# Patient Record
Sex: MALE | Race: WHITE | NOT HISPANIC OR LATINO | Employment: FULL TIME | URBAN - METROPOLITAN AREA
[De-identification: names, ages, dates, MRNs, and addresses within clinical notes are randomized per-mention and may not be internally consistent; named-entity substitution may affect disease eponyms.]

---

## 2017-05-30 ENCOUNTER — OFFICE VISIT - HEALTHEAST (OUTPATIENT)
Dept: INTERNAL MEDICINE | Facility: CLINIC | Age: 47
End: 2017-05-30

## 2017-05-30 DIAGNOSIS — R22.9 SKIN NODULE: ICD-10-CM

## 2017-05-30 DIAGNOSIS — Z12.11 COLON CANCER SCREENING: ICD-10-CM

## 2018-01-01 ENCOUNTER — TRANSFERRED RECORDS (OUTPATIENT)
Dept: MULTI SPECIALTY CLINIC | Facility: CLINIC | Age: 48
End: 2018-01-01

## 2018-05-11 ENCOUNTER — TELEPHONE (OUTPATIENT)
Dept: OTHER | Facility: CLINIC | Age: 48
End: 2018-05-11

## 2018-05-11 NOTE — TELEPHONE ENCOUNTER
5/11/2018    Call Regarding Onboarding [employer] [facility] other     Attempt 1    Message on voicemail    Comments: 1 spouse dep       Outreach   Renu Johnston

## 2018-05-30 NOTE — TELEPHONE ENCOUNTER
5/30/2018    Call Regarding Onboarding [employer] [facility] OTHER    Attempt 2    Message on voicemail     Comments: spouse      Outreach   ROHAN

## 2018-06-11 NOTE — TELEPHONE ENCOUNTER
6/11/2018    Call Regarding Onboarding [facility] [employer] OTHER     Attempt 3    Message on voicemail     Comments:       Outreach   SB

## 2021-05-31 VITALS — WEIGHT: 195.1 LBS | BODY MASS INDEX: 26.31 KG/M2

## 2021-06-10 NOTE — PROGRESS NOTES
ASSESSMENT:  1. Colon cancer screening  His brother had a polyp (adenoma) removed at age 50, Dr. Lino Ann recommended siblings start colon cancer screening at age 40.  As a result, I counseled him on colon cancer screening, referred him to [place] [facility].  - Ambulatory referral for Colonoscopy    2. Skin nodule  He had a small pea-sized nodule in the left axilla which resolved within a week.  He had a similar lesion in his neck which also resolved quickly.  I advised him that this could be a shotty lymph node.  Also on the differential would be minor skin or subcutaneous infection.  If it is otherwise asymptomatic, small, and resolves within a month, it is unlikely to require additional workup.  Contrasted to his skin infection or abscess, he should seek medical attention for the infection.    He gets cholesterol and blood sugar checks through work.  His blood pressure is normal.  He has had a healthy body weight, if not borderline overweight based on BMI standard.  I think he is in good health.        PLAN:  Patient Instructions   Colonoscopy referral--  See  downstairs     Pea-sized nodule in armpit-- let me know if recurs and persists >1 month or is growing       Orders Placed This Encounter   Procedures     Ambulatory referral for Colonoscopy     Referral Priority:   Routine     Referral Type:   Colonoscopy     Referral Reason:   Evaluation and Treatment     Requested Specialty:   Gastroenterology     Number of Visits Requested:   1     There are no discontinued medications.    Return if symptoms worsen or fail to improve, for Annual physical.    CHIEF COMPLAINT:  Chief Complaint   Patient presents with     lump under arm     lump under left armpit-has since gone away. talk about getting a colonoscopy       HISTORY OF PRESENT ILLNESS:  Zachariah is a 47 y.o. male presenting to the clinic today with concerns of a lump. About two weeks ago he noticed a pea sized lump under his left  armpit. The lump was not visible to the naked eye but was easily palpated, he reports. However, in the past few days the lump has mysteriously resolved and is no longer there.  He had a similar lump in his neck a few months ago and saw Dr. Leal but the lump then had resolved by the time he saw Dr. Leal.     Health maintenance:  He would like to have a colonoscopy as he received a letter from his brother's gastroenterologist recommending his siblings get screening colonoscopies after a tubular adenoma was found.     REVIEW OF SYSTEMS:   He denies any skin rashes or painful lesions.   All other systems are negative.    PFSH:  He is from [place]. He moved to the [facility] in 1998. His father is a retired internist from [place].    TOBACCO USE:  History   Smoking Status     Never Smoker   Smokeless Tobacco     Not on file       VITALS:  Vitals:    05/30/17 1517   BP: 128/82   Pulse: 66   Weight: 195 lb 1.6 oz (88.5 kg)     Wt Readings from Last 3 Encounters:   05/30/17 195 lb 1.6 oz (88.5 kg)   02/17/16 185 lb (83.9 kg)       PHYSICAL EXAM:  Lymphatics: No adenopathy in the axilla bilaterally.   Skin:  Clear. No rashes.     ADDITIONAL HISTORY SUMMARIZED (2): Notes from 2/2017 from Dr. Leal reviewed regarding skin irritation.  DECISION TO OBTAIN EXTRA INFORMATION (1): Care everywhere accessed.   RADIOLOGY TESTS (1): None.  LABS (1): None.  MEDICINE TESTS (1): None.  INDEPENDENT REVIEW (2 each): None.     QUALITY MEASURES:  The following high BMI interventions were performed this visit: encouragement to exercise     The visit lasted a total of 18 minutes face to face with the patient. Over 50% of the time was spent counseling and educating the patient about health maintenance.    ISteven, am scribing for and in the presence of, Dr. Almonte.    I, Dr. Almonte, personally performed the services described in this documentation, as scribed by Steven Granado in my presence, and it is both accurate and  complete.    MEDICATIONS:  No current outpatient prescriptions on file.     No current facility-administered medications for this visit.        Total data points: 3.     Steven Almonte DO  Internal Medicine  [facility]

## 2021-06-27 ENCOUNTER — HEALTH MAINTENANCE LETTER (OUTPATIENT)
Age: 51
End: 2021-06-27

## 2021-10-13 ENCOUNTER — OFFICE VISIT (OUTPATIENT)
Dept: FAMILY MEDICINE | Facility: CLINIC | Age: 51
End: 2021-10-13
Payer: COMMERCIAL

## 2021-10-13 VITALS
HEIGHT: 72 IN | HEART RATE: 82 BPM | TEMPERATURE: 97.2 F | BODY MASS INDEX: 23.3 KG/M2 | WEIGHT: 172 LBS | SYSTOLIC BLOOD PRESSURE: 93 MMHG | RESPIRATION RATE: 18 BRPM | DIASTOLIC BLOOD PRESSURE: 62 MMHG

## 2021-10-13 DIAGNOSIS — Z00.00 VISIT FOR PREVENTIVE HEALTH EXAMINATION: Primary | ICD-10-CM

## 2021-10-13 DIAGNOSIS — Z13.228 SCREENING FOR ENDOCRINE, NUTRITIONAL, METABOLIC AND IMMUNITY DISORDER: ICD-10-CM

## 2021-10-13 DIAGNOSIS — Z13.0 SCREENING FOR ENDOCRINE, NUTRITIONAL, METABOLIC AND IMMUNITY DISORDER: ICD-10-CM

## 2021-10-13 DIAGNOSIS — Z11.4 SCREENING FOR HUMAN IMMUNODEFICIENCY VIRUS WITHOUT PRESENCE OF RISK FACTORS: ICD-10-CM

## 2021-10-13 DIAGNOSIS — Z13.29 SCREENING FOR ENDOCRINE, NUTRITIONAL, METABOLIC AND IMMUNITY DISORDER: ICD-10-CM

## 2021-10-13 DIAGNOSIS — Z12.5 SCREENING FOR PROSTATE CANCER: ICD-10-CM

## 2021-10-13 DIAGNOSIS — Z11.59 ENCOUNTER FOR HCV SCREENING TEST FOR LOW RISK PATIENT: ICD-10-CM

## 2021-10-13 DIAGNOSIS — Z12.11 SCREEN FOR COLON CANCER: ICD-10-CM

## 2021-10-13 DIAGNOSIS — R06.02 SOB (SHORTNESS OF BREATH): ICD-10-CM

## 2021-10-13 DIAGNOSIS — Z82.49 FAMILY HISTORY OF PREMATURE CORONARY ARTERY DISEASE: ICD-10-CM

## 2021-10-13 DIAGNOSIS — Z13.21 SCREENING FOR ENDOCRINE, NUTRITIONAL, METABOLIC AND IMMUNITY DISORDER: ICD-10-CM

## 2021-10-13 LAB
ALBUMIN SERPL-MCNC: 4.3 G/DL (ref 3.5–5)
ALP SERPL-CCNC: 63 U/L (ref 45–120)
ALT SERPL W P-5'-P-CCNC: 15 U/L (ref 0–45)
ANION GAP SERPL CALCULATED.3IONS-SCNC: 8 MMOL/L (ref 5–18)
AST SERPL W P-5'-P-CCNC: 15 U/L (ref 0–40)
BILIRUB SERPL-MCNC: 0.4 MG/DL (ref 0–1)
BUN SERPL-MCNC: 19 MG/DL (ref 8–22)
CALCIUM SERPL-MCNC: 10 MG/DL (ref 8.5–10.5)
CHLORIDE BLD-SCNC: 103 MMOL/L (ref 98–107)
CHOLEST SERPL-MCNC: 173 MG/DL
CO2 SERPL-SCNC: 29 MMOL/L (ref 22–31)
CREAT SERPL-MCNC: 0.9 MG/DL (ref 0.7–1.3)
ERYTHROCYTE [DISTWIDTH] IN BLOOD BY AUTOMATED COUNT: 12.4 % (ref 10–15)
FASTING STATUS PATIENT QL REPORTED: YES
GFR SERPL CREATININE-BSD FRML MDRD: >90 ML/MIN/1.73M2
GLUCOSE BLD-MCNC: 100 MG/DL (ref 70–125)
HBA1C MFR BLD: 5.7 % (ref 0–5.6)
HCT VFR BLD AUTO: 45.1 % (ref 40–53)
HDLC SERPL-MCNC: 58 MG/DL
HGB BLD-MCNC: 14.6 G/DL (ref 13.3–17.7)
HIV 1+2 AB+HIV1 P24 AG SERPL QL IA: NEGATIVE
LDLC SERPL CALC-MCNC: 105 MG/DL
MCH RBC QN AUTO: 28.9 PG (ref 26.5–33)
MCHC RBC AUTO-ENTMCNC: 32.4 G/DL (ref 31.5–36.5)
MCV RBC AUTO: 89 FL (ref 78–100)
PLATELET # BLD AUTO: 236 10E3/UL (ref 150–450)
POTASSIUM BLD-SCNC: 4.7 MMOL/L (ref 3.5–5)
PROT SERPL-MCNC: 7.6 G/DL (ref 6–8)
PSA SERPL-MCNC: 0.56 UG/L (ref 0–3.5)
RBC # BLD AUTO: 5.06 10E6/UL (ref 4.4–5.9)
SODIUM SERPL-SCNC: 140 MMOL/L (ref 136–145)
TRIGL SERPL-MCNC: 49 MG/DL
WBC # BLD AUTO: 4.6 10E3/UL (ref 4–11)

## 2021-10-13 PROCEDURE — 90471 IMMUNIZATION ADMIN: CPT | Performed by: FAMILY MEDICINE

## 2021-10-13 PROCEDURE — 85027 COMPLETE CBC AUTOMATED: CPT | Performed by: FAMILY MEDICINE

## 2021-10-13 PROCEDURE — G0103 PSA SCREENING: HCPCS | Performed by: FAMILY MEDICINE

## 2021-10-13 PROCEDURE — 80053 COMPREHEN METABOLIC PANEL: CPT | Performed by: FAMILY MEDICINE

## 2021-10-13 PROCEDURE — 36415 COLL VENOUS BLD VENIPUNCTURE: CPT | Performed by: FAMILY MEDICINE

## 2021-10-13 PROCEDURE — 90714 TD VACC NO PRESV 7 YRS+ IM: CPT | Performed by: FAMILY MEDICINE

## 2021-10-13 PROCEDURE — 86803 HEPATITIS C AB TEST: CPT | Performed by: FAMILY MEDICINE

## 2021-10-13 PROCEDURE — 90472 IMMUNIZATION ADMIN EACH ADD: CPT | Performed by: FAMILY MEDICINE

## 2021-10-13 PROCEDURE — 99386 PREV VISIT NEW AGE 40-64: CPT | Mod: 25 | Performed by: FAMILY MEDICINE

## 2021-10-13 PROCEDURE — 83036 HEMOGLOBIN GLYCOSYLATED A1C: CPT | Performed by: FAMILY MEDICINE

## 2021-10-13 PROCEDURE — 90750 HZV VACC RECOMBINANT IM: CPT | Performed by: FAMILY MEDICINE

## 2021-10-13 PROCEDURE — 80061 LIPID PANEL: CPT | Performed by: FAMILY MEDICINE

## 2021-10-13 PROCEDURE — 87389 HIV-1 AG W/HIV-1&-2 AB AG IA: CPT | Performed by: FAMILY MEDICINE

## 2021-10-13 ASSESSMENT — PATIENT HEALTH QUESTIONNAIRE - PHQ9
SUM OF ALL RESPONSES TO PHQ QUESTIONS 1-9: 5
5. POOR APPETITE OR OVEREATING: MORE THAN HALF THE DAYS

## 2021-10-13 ASSESSMENT — MIFFLIN-ST. JEOR: SCORE: 1668.19

## 2021-10-13 ASSESSMENT — ANXIETY QUESTIONNAIRES
6. BECOMING EASILY ANNOYED OR IRRITABLE: NOT AT ALL
3. WORRYING TOO MUCH ABOUT DIFFERENT THINGS: SEVERAL DAYS
2. NOT BEING ABLE TO STOP OR CONTROL WORRYING: MORE THAN HALF THE DAYS
7. FEELING AFRAID AS IF SOMETHING AWFUL MIGHT HAPPEN: NOT AT ALL
GAD7 TOTAL SCORE: 7
1. FEELING NERVOUS, ANXIOUS, OR ON EDGE: MORE THAN HALF THE DAYS
IF YOU CHECKED OFF ANY PROBLEMS ON THIS QUESTIONNAIRE, HOW DIFFICULT HAVE THESE PROBLEMS MADE IT FOR YOU TO DO YOUR WORK, TAKE CARE OF THINGS AT HOME, OR GET ALONG WITH OTHER PEOPLE: NOT DIFFICULT AT ALL
5. BEING SO RESTLESS THAT IT IS HARD TO SIT STILL: NOT AT ALL

## 2021-10-13 NOTE — PROGRESS NOTES
SUBJECTIVE:   CC: Zachariah Taylor is an 51 year old male who presents for preventative health visit.       Patient has been advised of split billing requirements and indicates understanding: Yes  Healthy Habits:     Getting at least 3 servings of Calcium per day:  NO    Bi-annual eye exam:  Yes    Dental care twice a year:  NO    Sleep apnea or symptoms of sleep apnea:  None    Diet:  Vegetarian/vegan    Frequency of exercise:  4-5 days/week    Duration of exercise:  30-45 minutes    Taking medications regularly:  Not Applicable    Medication side effects:  Not applicable    PHQ-2 Total Score: 0    Additional concerns today:  No    Shortness of breath with low blood pressure,Especially in the morning.  Mom has heart attack at age 52, brother just recently had a stent placed in his heart, dad has history of coronary artery disease.  Previous colonoscopy with polyps removal, due for follow-up.  Lots of stress at work lately.      Today's PHQ-2 Score:   PHQ-2 ( 1999 [employer]) 10/13/2021   Q1: Little interest or pleasure in doing things 0   Q2: Feeling down, depressed or hopeless 0   PHQ-2 Score 0   Q1: Little interest or pleasure in doing things Not at all   Q2: Feeling down, depressed or hopeless Not at all   PHQ-2 Score 0       Abuse: Current or Past(Physical, Sexual or Emotional)- No  Do you feel safe in your environment? Yes    Have you ever done Advance Care Planning? (For example, a Health Directive, POLST, or a discussion with a medical provider or your loved ones about your wishes): Yes, patient states has an Advance Care Planning document and will bring a copy to the clinic.    Social History     Tobacco Use     Smoking status: Never Smoker     Smokeless tobacco: Never Used   Substance Use Topics     Alcohol use: Not on file     If you drink alcohol do you typically have >3 drinks per day or >7 drinks per week? No    Alcohol Use 10/13/2021   Prescreen: >3 drinks/day or >7 drinks/week? Not Applicable        Last PSA: No results found for: PSA    Reviewed orders with patient. Reviewed health maintenance and updated orders accordingly - Yes  Lab work is in process  Labs reviewed in EPIC  BP Readings from Last 3 Encounters:   10/13/21 93/62    Wt Readings from Last 3 Encounters:   10/13/21 78 kg (172 lb)   05/30/17 88.5 kg (195 lb 1.6 oz)   02/17/16 83.9 kg (185 lb)                  There is no problem list on file for this patient.    No past surgical history on file.    Social History     Tobacco Use     Smoking status: Never Smoker     Smokeless tobacco: Never Used   Substance Use Topics     Alcohol use: Not on file     Family History   Problem Relation Age of Onset     Depression Mother      Heart Disease Mother      Dementia Maternal Uncle      Heart Disease Maternal Uncle      Dementia Maternal Grandmother      Alcoholism Maternal Grandfather          No current outpatient medications on file.     No Known Allergies    Reviewed and updated as needed this visit by clinical staff  Tobacco  Allergies  Meds              Reviewed and updated as needed this visit by Provider                No past medical history on file.   No past surgical history on file.  OB History   No obstetric history on file.       Review of Systems  CONSTITUTIONAL: NEGATIVE for fever, chills, change in weight  INTEGUMENTARY/SKIN: NEGATIVE for worrisome rashes, moles or lesions  EYES: NEGATIVE for vision changes or irritation  ENT: NEGATIVE for ear, mouth and throat problems  RESP: NEGATIVE for significant cough or SOB  CV: NEGATIVE for chest pain, palpitations or peripheral edema  GI: NEGATIVE for nausea, abdominal pain, heartburn, or change in bowel habits   male: negative for dysuria, hematuria, decreased urinary stream, erectile dysfunction, urethral discharge  MUSCULOSKELETAL: NEGATIVE for significant arthralgias or myalgia  NEURO: NEGATIVE for weakness, dizziness or paresthesias  ENDOCRINE: NEGATIVE for temperature intolerance,  skin/hair changes  PSYCHIATRIC: NEGATIVE for changes in mood or affect    OBJECTIVE:   BP 93/62 (BP Location: Left arm, Patient Position: Sitting, Cuff Size: Adult Regular)   Pulse 82   Temp 97.2  F (36.2  C) (Temporal)   Resp 18   Ht 1.829 m (6')   Wt 78 kg (172 lb)   BMI 23.33 kg/m      Physical Exam  GENERAL: healthy, alert and no distress  EYES: Eyes grossly normal to inspection, PERRL and conjunctivae and sclerae normal  HENT: ear canals and TM's normal, nose and mouth without ulcers or lesions  NECK: no adenopathy, no asymmetry, masses, or scars and thyroid normal to palpation  RESP: lungs clear to auscultation - no rales, rhonchi or wheezes  CV: regular rate and rhythm, normal S1 S2, no S3 or S4, no murmur, click or rub, no peripheral edema and peripheral pulses strong  ABDOMEN: soft, nontender, no hepatosplenomegaly, no masses and bowel sounds normal  MS: no gross musculoskeletal defects noted, no edema  SKIN: no suspicious lesions or rashes  NEURO: Normal strength and tone, mentation intact and speech normal  PSYCH: mentation appears normal, affect normal/bright    Diagnostic Test Results:  Labs reviewed in Epic    ASSESSMENT/PLAN:   (Z00.00) Visit for preventive health examination  (primary encounter diagnosis)  Comment:   Plan: Continue regular physical activities, healthy lifestyle living.    (Z13.29,  Z13.21,  Z13.228,  Z13.0) Screening for endocrine, nutritional, metabolic and immunity disorder  Comment:   Plan: **Comprehensive metabolic panel FUTURE 14d,         **A1C FUTURE 3mo, Lipid panel reflex to direct         LDL Fasting, **CBC with platelets FUTURE 14d            (Z11.59) Encounter for HCV screening test for low risk patient  Comment:   Plan: Hepatitis C antibody            (Z12.5) Screening for prostate cancer  Comment:   Plan: PSA, screen            (Z11.4) Screening for human immunodeficiency virus without presence of risk factors  Comment:   Plan: HIV Antigen Antibody Combo             (Z82.49) Family history of premature coronary artery disease  Comment: Family history of premature coronary artery disease, mom has heart attack at age 52, Older's brother just recently had a stent placed  Plan: CT Coronary Artery Angio w Calcium Score            (R06.02) SOB (shortness of breath)  Comment:   Plan: CT Coronary Artery Angio w Calcium Score            (Z12.11) Screen for colon cancer  Comment: Had a negative colonoscopy in 2018, was told that he needs to follow-up in 3 years.  Family states positive for polyps.  Plan: Adult Gastro Ref - Procedure Only              Patient has been advised of split billing requirements and indicates understanding: Yes  COUNSELING:   Reviewed preventive health counseling, as reflected in patient instructions       Regular exercise       Healthy diet/nutrition       Vision screening       Hearing screening       Consider Hep C screening for all patients one time for ages 18-79 years       HIV screeninx in teen years, 1x in adult years, and at intervals if high risk       Colon cancer screening       Prostate cancer screening       Advance Care Planning    Estimated body mass index is 23.33 kg/m  as calculated from the following:    Height as of this encounter: 1.829 m (6').    Weight as of this encounter: 78 kg (172 lb).         He reports that he has never smoked. He has never used smokeless tobacco.      Counseling Resources:  ATP IV Guidelines  Pooled Cohorts Equation Calculator  FRAX Risk Assessment  ICSI Preventive Guidelines  Dietary Guidelines for Americans,   USDA's MyPlate  ASA Prophylaxis  Lung CA Screening    Ean Leal MD  [facility]

## 2021-10-14 LAB — HCV AB SERPL QL IA: NEGATIVE

## 2021-10-14 ASSESSMENT — ANXIETY QUESTIONNAIRES: GAD7 TOTAL SCORE: 7

## 2021-10-17 ENCOUNTER — HEALTH MAINTENANCE LETTER (OUTPATIENT)
Age: 51
End: 2021-10-17

## 2021-10-28 ENCOUNTER — HOSPITAL ENCOUNTER (OUTPATIENT)
Dept: CT IMAGING | Facility: CLINIC | Age: 51
Discharge: HOME | End: 2021-10-28
Attending: FAMILY MEDICINE | Admitting: FAMILY MEDICINE
Payer: COMMERCIAL

## 2021-10-28 VITALS
DIASTOLIC BLOOD PRESSURE: 73 MMHG | SYSTOLIC BLOOD PRESSURE: 96 MMHG | BODY MASS INDEX: 23.7 KG/M2 | WEIGHT: 175 LBS | HEIGHT: 72 IN

## 2021-10-28 DIAGNOSIS — R06.02 SOB (SHORTNESS OF BREATH): ICD-10-CM

## 2021-10-28 DIAGNOSIS — Z82.49 FAMILY HISTORY OF PREMATURE CORONARY ARTERY DISEASE: ICD-10-CM

## 2021-10-28 DIAGNOSIS — Z13.6 ENCOUNTER FOR SCREENING FOR CORONARY ARTERY DISEASE: Primary | ICD-10-CM

## 2021-10-28 LAB
BSA FOR ECHO PROCEDURE: 2.01 M2
CV CALCIUM SCORE AGATSTON LM: 14
CV CALCIUM SCORING AGATSON LAD: 7
CV CALCIUM SCORING AGATSTON CX: 0
CV CALCIUM SCORING AGATSTON RCA: 0
CV CALCIUM SCORING AGATSTON TOTAL: 21

## 2021-10-28 PROCEDURE — 250N000013 HC RX MED GY IP 250 OP 250 PS 637: Performed by: FAMILY MEDICINE

## 2021-10-28 PROCEDURE — 250N000011 HC RX IP 250 OP 636: Performed by: FAMILY MEDICINE

## 2021-10-28 PROCEDURE — 75574 CT ANGIO HRT W/3D IMAGE: CPT

## 2021-10-28 PROCEDURE — 75574 CT ANGIO HRT W/3D IMAGE: CPT | Mod: 26 | Performed by: INTERNAL MEDICINE

## 2021-10-28 PROCEDURE — 250N000009 HC RX 250: Performed by: FAMILY MEDICINE

## 2021-10-28 RX ORDER — LIDOCAINE 40 MG/G
CREAM TOPICAL
Status: DISCONTINUED | OUTPATIENT
Start: 2021-10-28 | End: 2021-10-29 | Stop reason: HOSPADM

## 2021-10-28 RX ORDER — METOPROLOL TARTRATE 1 MG/ML
5-20 INJECTION, SOLUTION INTRAVENOUS
Status: DISCONTINUED | OUTPATIENT
Start: 2021-10-28 | End: 2021-10-29 | Stop reason: HOSPADM

## 2021-10-28 RX ORDER — DILTIAZEM HYDROCHLORIDE 5 MG/ML
10 INJECTION INTRAVENOUS
Status: DISCONTINUED | OUTPATIENT
Start: 2021-10-28 | End: 2021-10-29 | Stop reason: HOSPADM

## 2021-10-28 RX ORDER — IOPAMIDOL 755 MG/ML
100 INJECTION, SOLUTION INTRAVASCULAR ONCE
Status: COMPLETED | OUTPATIENT
Start: 2021-10-28 | End: 2021-10-28

## 2021-10-28 RX ORDER — NITROGLYCERIN 0.4 MG/1
0.4 TABLET SUBLINGUAL ONCE
Status: COMPLETED | OUTPATIENT
Start: 2021-10-28 | End: 2021-10-28

## 2021-10-28 RX ORDER — DILTIAZEM HYDROCHLORIDE 5 MG/ML
5-25 INJECTION INTRAVENOUS
Status: DISCONTINUED | OUTPATIENT
Start: 2021-10-28 | End: 2021-10-29 | Stop reason: HOSPADM

## 2021-10-28 RX ADMIN — METOPROLOL TARTRATE 5 MG: 1 INJECTION, SOLUTION INTRAVENOUS at 13:36

## 2021-10-28 RX ADMIN — NITROGLYCERIN 0.4 MG: 0.4 TABLET SUBLINGUAL at 13:32

## 2021-10-28 RX ADMIN — IOPAMIDOL 100 ML: 755 INJECTION, SOLUTION INTRAVENOUS at 13:36

## 2021-10-28 ASSESSMENT — MIFFLIN-ST. JEOR: SCORE: 1681.79

## 2021-11-03 ENCOUNTER — OFFICE VISIT (OUTPATIENT)
Dept: CARDIOLOGY | Facility: CLINIC | Age: 51
End: 2021-11-03
Payer: COMMERCIAL

## 2021-11-03 VITALS
DIASTOLIC BLOOD PRESSURE: 64 MMHG | SYSTOLIC BLOOD PRESSURE: 104 MMHG | WEIGHT: 176.3 LBS | BODY MASS INDEX: 23.91 KG/M2 | RESPIRATION RATE: 14 BRPM | HEART RATE: 60 BPM

## 2021-11-03 DIAGNOSIS — I25.10 CORONARY ARTERY DISEASE DUE TO LIPID RICH PLAQUE: Primary | ICD-10-CM

## 2021-11-03 DIAGNOSIS — I25.83 CORONARY ARTERY DISEASE DUE TO LIPID RICH PLAQUE: Primary | ICD-10-CM

## 2021-11-03 DIAGNOSIS — Z82.49 FAMILY HISTORY OF PREMATURE CORONARY ARTERY DISEASE: ICD-10-CM

## 2021-11-03 PROCEDURE — 99244 OFF/OP CNSLTJ NEW/EST MOD 40: CPT | Performed by: INTERNAL MEDICINE

## 2021-11-03 RX ORDER — ROSUVASTATIN CALCIUM 5 MG/1
5 TABLET, COATED ORAL DAILY
Qty: 90 TABLET | Refills: 4 | Status: SHIPPED | OUTPATIENT
Start: 2021-11-03 | End: 2022-11-14

## 2021-11-03 NOTE — PATIENT INSTRUCTIONS
We discussed scheduling a stress echocardiogram.We discussed and started a medication called Rosuvastatin at 5mg.Please monitor for muscle discomfort and we will plan blood work to follow up in 2 months.I will be in touch with the results suki the tests by my chart.My nurse is Beverley and her number is [phone]

## 2021-11-03 NOTE — LETTER
11/3/2021    Ean Lael MD  [address] [place] [address]    RE: Zachariah RICH Taylor       Dear Colleague,    I had the pleasure of seeing Zachariah Taylor in the [facility].    HEART CARE ENCOUNTER CONSULTATON NOTE      [facility]  [phone]      Assessment/Recommendations   Assessment/Plan:  1.  Patient endorses nonexertional shortness of breath does not sound clearly cardiac in etiology.  This has been a symptom has been present for some time and he does admit to a fair amount of work related stress.  Recent coronary CT angiography reported minimal coronary plaque with a calcium score of 21 placing him in the 50th percentile when compared to age and gender matched control group.  He has multiple family members with heart disease mostly would appear to be premature coronary artery disease the following issues were discussed.  1.  Dyspnea.  Again suspect noncardiac etiology but given persistent symptoms I have recommended stress echocardiography which will allow assessment of LV function pre and post exercise and to exclude structural causes of dyspnea.  We will plan to do this in the near future and has been requested.    2.  Risk modification.  As noted he has a mild elevation in coronary calcium scoring.  He has a significant family history of heart disease.  I discussed the potential initiation of low-dose rosuvastatin.  Given that his LDL is quite good at 105 I suggested starting on 5 mg and reevaluating.  We talked about the potential risks including musculoskeletal discomfort and liver abnormality.  He is interested in initiating rosuvastatin and having the blood work in approximately 2 months.  He will keep me updated if he has any specific symptoms or side effects related to the medication.  I did ask him to consider increasing his exercise regularity.    1.  Exercise stress echocardiogram  2.  Initiate Crestor 5 mg  daily with repeat cholesterol and liver tests in 2 months.  3.  Continue prudent diet and increase exercise.  4.  Further recommendations pending the outcome of the above tests.  If dyspnea persists despite evaluation of cardiovascular causes would refer back to his primary care physician to assess noncardiac causes.  Recent CT scan did not report any significant findings in the chest or mediastinum or upper abdomen.  Question whether there is some anxiety component to the dyspnea.  5.  We will follow up with the test results and make additional recommendations with tentative follow-up in 1 year pending the test results.       History of Present Illness/Subjective    HPI: Zachariah Taylor is a 51 year old male who is seen in consultation at the request of his primary care provider Dr. Leal.  He has a significant family history of heart disease which includes a father who had coronary artery disease, myocardial infarction and  of a stroke at age 74.  Mother with coronary artery disease in her later years.  3 uncles with coronary artery disease and heart attacks in their 60s.  First cousin who has coronary artery disease and  of a heart attack at age 39.  A brother who underwent coronary stenting to a 70% stenosis in the left anterior descending.  He has other family members with premature heart disease.    He reports no specific complaints of chest discomfort.  However he does endorse some shortness of breath.  He notes that when he first awakens for the day he feels short of breath and has to take a few breaths and symptoms improve over a 30-minute period of time.  He was sometimes feels short of breath at other times but not clearly exertional.  His primary exercise is walking the dog 5-6 times per week and does not notice any difficulty doing this activity or climbing stairs.  No reported symptoms of orthopnea or PND and no reported snoring history.    Cardiac risk factors are negative for tobacco,  negative for alcohol, hemoglobin A1c is borderline elevated at 5.7.  He is a vegetarian.  Lipids are reviewed and are most recently revealing of a total cholesterol 173, HDL 58, LDL of 105.  His fasting glucose was 100.  As outlined above he has a significant family history with multiple family members with premature coronary artery disease.  His primary care physician recently had him complete a CT coronary angiogram with the results described below.  No prior ECG.          Recent Coronary Angiogram Results:    Reading physician: Jose Mark MD Ordering physician: Ean Leal MD Study date: 10/28/2021       Patient Information    Patient Name   Zachariah Taylor MRN   [id number] Legal Sex   Male              Age   1970 (51 year old)     Reason for Exam  Priority: Routine  family h/o premature CAD; CAD screening, 10yr CHD risk < 10%; family h/o premature CAD   Dx: Family history of premature coronary artery disease [Z82.49 (ICD-10-CM)]; SOB (shortness of breath) [R06.02 (ICD-10-CM)]   Comments: Administration of IV contrast (contrast agent, dose, and amount) will be tailored to this examination per the appropriate written protocol listed in the Protocol E-Book, or by the supervising imaging provider.      Indications    Family history of premature coronary artery disease [Z82.49 (ICD-10-CM)]   SOB (shortness of breath) [R06.02 (ICD-10-CM)]     Interpretation Summary      The total Agatston score is 21. A calcium score in this range places the individual in the 50th percentile when compared to an age and gender matched control group and implies a moderate risk of cardiac events in the next ten years.    Normal coronary anatomy with minimal coronary atherosclerosis and no obstructive plaque identified.        Technical Details    TECHNIQUE:   gated CT scanning of the heart with and without intravenous contrast was performed on a Siemens Definition Flash CT scanner using Isovue 370 Omnipaque 350.   Sequential scanning was performed for coronary calcium evaluation. Spiral protocol for coronary CT angiography was performed .  61.  Pulse range 50% - 70% of the cardiac phase. The imaging protocol was individualized to minimize radiation exposure.  Image post processing was performed on a [employer] workstation.  This study was performed after discussion of the goals, risk benefits and alternatives of the procedure.  Risks discussed included the risk of contrast injection and diagnostic x-ray exposure.  The best reconstructions were obtained at 74% of the diastolic phase and 70% of the systolic phase.     Noncoronary Findings    Left Ventricle There is no thrombus present. There is no mass present.   Left Atrium No left atrial mass or thrombus.Normal pulmonary vein connection.   Aorta Normal caliber of the visualized proximal ascending aorta. Normal sized aortic root.   Aortic Valve Tricuspid aortic valve.   Pulmonary Artery Normal pulmonary artery and venous anatomy.  All pulmonary veins draining into the left atrium.   Pericardium Normal pericardial thickness.          Physical Examination  Review of Systems   Vitals: There were no vitals taken for this visit.  BMI= There is no height or weight on file to calculate BMI.  Wt Readings from Last 3 Encounters:   10/28/21 79.4 kg (175 lb)   10/13/21 78 kg (172 lb)   05/30/17 88.5 kg (195 lb 1.6 oz)       General Appearance:   no distress, normal body habitus   ENT/Mouth: membranes moist, no oral lesions or bleeding gums.      EYES:  no scleral icterus, normal conjunctivae   Neck: no carotid bruits or thyromegaly   Chest/Lungs:   lungs are clear to auscultation, no rales or wheezing,, equal chest wall expansion    Cardiovascular:   Regular. Normal first and second heart sounds with no murmurs, rubs, or gallops; the carotid, radial and posterior tibial pulses are intact, Jugular venous pressure within normal limits, no edema bilaterally    Abdomen:  no organomegaly,  masses, bruits, or tenderness; bowel sounds are present   Extremities: no cyanosis or clubbing   Skin: no xanthelasma, warm.    Neurologic: normal  bilateral, no tremors     Psychiatric: alert and oriented x3, calm        Please refer above for cardiac ROS details.        Medical History  Surgical History Family History Social History   No past medical history on file.  No past surgical history on file.  Family History   Problem Relation Age of Onset     Depression Mother      Heart Disease Mother      Dementia Maternal Uncle      Heart Disease Maternal Uncle      Dementia Maternal Grandmother      Alcoholism Maternal Grandfather         Social History     Socioeconomic History     Marital status:      Spouse name: Not on file     Number of children: Not on file     Years of education: Not on file     Highest education level: Not on file   Occupational History     Not on file   Tobacco Use     Smoking status: Never Smoker     Smokeless tobacco: Never Used   Substance and Sexual Activity     Alcohol use: Not on file     Drug use: Not on file     Sexual activity: Not on file   Other Topics Concern     Not on file   Social History Narrative     Not on file     Social Determinants of Health     Financial Resource Strain:      Difficulty of Paying Living Expenses:    Food Insecurity:      Worried About Running Out of Food in the Last Year:      Ran Out of Food in the Last Year:    Transportation Needs:      Lack of Transportation (Medical):      Lack of Transportation (Non-Medical):    Physical Activity:      Days of Exercise per Week:      Minutes of Exercise per Session:    Stress:      Feeling of Stress :    Social Connections:      Frequency of Communication with Friends and Family:      Frequency of Social Gatherings with Friends and Family:      Attends Shinto Services:      Active Member of Clubs or Organizations:      Attends Club or Organization Meetings:      Marital Status:    Intimate Partner  Violence:      Fear of Current or Ex-Partner:      Emotionally Abused:      Physically Abused:      Sexually Abused:            Medications  Allergies   No current outpatient medications on file.     No Known Allergies       Lab Results    Chemistry/lipid CBC Cardiac Enzymes/BNP/TSH/INR   Recent Labs   Lab Test 10/13/21  1025   CHOL 173   HDL 58      TRIG 49     Recent Labs   Lab Test 10/13/21  1025        Recent Labs   Lab Test 10/13/21  1025      POTASSIUM 4.7   CHLORIDE 103   CO2 29      BUN 19   CR 0.90   GFRESTIMATED >90   ROBY 10.0     Recent Labs   Lab Test 10/13/21  1025   CR 0.90     Recent Labs   Lab Test 10/13/21  1025   A1C 5.7*          Recent Labs   Lab Test 10/13/21  1025   WBC 4.6   HGB 14.6   HCT 45.1   MCV 89        Recent Labs   Lab Test 10/13/21  1025   HGB 14.6    No results for input(s): TROPONINI in the last 72432 hours.  No results for input(s): BNP, NTBNPI, NTBNP in the last 35392 hours.  No results for input(s): TSH in the last 45551 hours.  No results for input(s): INR in the last 12379 hours.     Chau Liu MD  [facility]    cc:   Ean Leal MD  [address] [place] [address]

## 2021-11-03 NOTE — PROGRESS NOTES
HEART CARE ENCOUNTER CONSULTATON NOTE      [facility]  [phone]      Assessment/Recommendations   Assessment/Plan:  1.  Patient endorses nonexertional shortness of breath does not sound clearly cardiac in etiology.  This has been a symptom has been present for some time and he does admit to a fair amount of work related stress.  Recent coronary CT angiography reported minimal coronary plaque with a calcium score of 21 placing him in the 50th percentile when compared to age and gender matched control group.  He has multiple family members with heart disease mostly would appear to be premature coronary artery disease the following issues were discussed.  1.  Dyspnea.  Again suspect noncardiac etiology but given persistent symptoms I have recommended stress echocardiography which will allow assessment of LV function pre and post exercise and to exclude structural causes of dyspnea.  We will plan to do this in the near future and has been requested.    2.  Risk modification.  As noted he has a mild elevation in coronary calcium scoring.  He has a significant family history of heart disease.  I discussed the potential initiation of low-dose rosuvastatin.  Given that his LDL is quite good at 105 I suggested starting on 5 mg and reevaluating.  We talked about the potential risks including musculoskeletal discomfort and liver abnormality.  He is interested in initiating rosuvastatin and having the blood work in approximately 2 months.  He will keep me updated if he has any specific symptoms or side effects related to the medication.  I did ask him to consider increasing his exercise regularity.    1.  Exercise stress echocardiogram  2.  Initiate Crestor 5 mg daily with repeat cholesterol and liver tests in 2 months.  3.  Continue prudent diet and increase exercise.  4.  Further recommendations pending the outcome of the above tests.  If dyspnea persists despite evaluation of cardiovascular causes  would refer back to his primary care physician to assess noncardiac causes.  Recent CT scan did not report any significant findings in the chest or mediastinum or upper abdomen.  Question whether there is some anxiety component to the dyspnea.  5.  We will follow up with the test results and make additional recommendations with tentative follow-up in 1 year pending the test results.       History of Present Illness/Subjective    HPI: Zachariah Taylor is a 51 year old male who is seen in consultation at the request of his primary care provider Dr. Leal.  He has a significant family history of heart disease which includes a father who had coronary artery disease, myocardial infarction and  of a stroke at age 74.  Mother with coronary artery disease in her later years.  3 uncles with coronary artery disease and heart attacks in their 60s.  First cousin who has coronary artery disease and  of a heart attack at age 39.  A brother who underwent coronary stenting to a 70% stenosis in the left anterior descending.  He has other family members with premature heart disease.    He reports no specific complaints of chest discomfort.  However he does endorse some shortness of breath.  He notes that when he first awakens for the day he feels short of breath and has to take a few breaths and symptoms improve over a 30-minute period of time.  He was sometimes feels short of breath at other times but not clearly exertional.  His primary exercise is walking the dog 5-6 times per week and does not notice any difficulty doing this activity or climbing stairs.  No reported symptoms of orthopnea or PND and no reported snoring history.    Cardiac risk factors are negative for tobacco, negative for alcohol, hemoglobin A1c is borderline elevated at 5.7.  He is a vegetarian.  Lipids are reviewed and are most recently revealing of a total cholesterol 173, HDL 58, LDL of 105.  His fasting glucose was 100.  As outlined above he has a  significant family history with multiple family members with premature coronary artery disease.  His primary care physician recently had him complete a CT coronary angiogram with the results described below.  No prior ECG.          Recent Coronary Angiogram Results:    Reading physician: Jose Mark MD Ordering physician: Ean Leal MD Study date: 10/28/2021       Patient Information    Patient Name   Zachariah Taylor MRN   [id number] Legal Sex   Male              Age   1970 (51 year old)     Reason for Exam  Priority: Routine  family h/o premature CAD; CAD screening, 10yr CHD risk < 10%; family h/o premature CAD   Dx: Family history of premature coronary artery disease [Z82.49 (ICD-10-CM)]; SOB (shortness of breath) [R06.02 (ICD-10-CM)]   Comments: Administration of IV contrast (contrast agent, dose, and amount) will be tailored to this examination per the appropriate written protocol listed in the Protocol E-Book, or by the supervising imaging provider.      Indications    Family history of premature coronary artery disease [Z82.49 (ICD-10-CM)]   SOB (shortness of breath) [R06.02 (ICD-10-CM)]     Interpretation Summary      The total Agatston score is 21. A calcium score in this range places the individual in the 50th percentile when compared to an age and gender matched control group and implies a moderate risk of cardiac events in the next ten years.    Normal coronary anatomy with minimal coronary atherosclerosis and no obstructive plaque identified.        Technical Details    TECHNIQUE:   gated CT scanning of the heart with and without intravenous contrast was performed on a Siemens Definition Flash CT scanner using Isovue 370 Omnipaque 350.  Sequential scanning was performed for coronary calcium evaluation. Spiral protocol for coronary CT angiography was performed .  61.  Pulse range 50% - 70% of the cardiac phase. The imaging protocol was individualized to minimize radiation  exposure.  Image post processing was performed on a [employer] workstation.  This study was performed after discussion of the goals, risk benefits and alternatives of the procedure.  Risks discussed included the risk of contrast injection and diagnostic x-ray exposure.  The best reconstructions were obtained at 74% of the diastolic phase and 70% of the systolic phase.     Noncoronary Findings    Left Ventricle There is no thrombus present. There is no mass present.   Left Atrium No left atrial mass or thrombus.Normal pulmonary vein connection.   Aorta Normal caliber of the visualized proximal ascending aorta. Normal sized aortic root.   Aortic Valve Tricuspid aortic valve.   Pulmonary Artery Normal pulmonary artery and venous anatomy.  All pulmonary veins draining into the left atrium.   Pericardium Normal pericardial thickness.          Physical Examination  Review of Systems   Vitals: There were no vitals taken for this visit.  BMI= There is no height or weight on file to calculate BMI.  Wt Readings from Last 3 Encounters:   10/28/21 79.4 kg (175 lb)   10/13/21 78 kg (172 lb)   05/30/17 88.5 kg (195 lb 1.6 oz)       General Appearance:   no distress, normal body habitus   ENT/Mouth: membranes moist, no oral lesions or bleeding gums.      EYES:  no scleral icterus, normal conjunctivae   Neck: no carotid bruits or thyromegaly   Chest/Lungs:   lungs are clear to auscultation, no rales or wheezing,, equal chest wall expansion    Cardiovascular:   Regular. Normal first and second heart sounds with no murmurs, rubs, or gallops; the carotid, radial and posterior tibial pulses are intact, Jugular venous pressure within normal limits, no edema bilaterally    Abdomen:  no organomegaly, masses, bruits, or tenderness; bowel sounds are present   Extremities: no cyanosis or clubbing   Skin: no xanthelasma, warm.    Neurologic: normal  bilateral, no tremors     Psychiatric: alert and oriented x3, calm        Please refer  above for cardiac ROS details.        Medical History  Surgical History Family History Social History   No past medical history on file.  No past surgical history on file.  Family History   Problem Relation Age of Onset     Depression Mother      Heart Disease Mother      Dementia Maternal Uncle      Heart Disease Maternal Uncle      Dementia Maternal Grandmother      Alcoholism Maternal Grandfather         Social History     Socioeconomic History     Marital status:      Spouse name: Not on file     Number of children: Not on file     Years of education: Not on file     Highest education level: Not on file   Occupational History     Not on file   Tobacco Use     Smoking status: Never Smoker     Smokeless tobacco: Never Used   Substance and Sexual Activity     Alcohol use: Not on file     Drug use: Not on file     Sexual activity: Not on file   Other Topics Concern     Not on file   Social History Narrative     Not on file     Social Determinants of Health     Financial Resource Strain:      Difficulty of Paying Living Expenses:    Food Insecurity:      Worried About Running Out of Food in the Last Year:      Ran Out of Food in the Last Year:    Transportation Needs:      Lack of Transportation (Medical):      Lack of Transportation (Non-Medical):    Physical Activity:      Days of Exercise per Week:      Minutes of Exercise per Session:    Stress:      Feeling of Stress :    Social Connections:      Frequency of Communication with Friends and Family:      Frequency of Social Gatherings with Friends and Family:      Attends Yarsanism Services:      Active Member of Clubs or Organizations:      Attends Club or Organization Meetings:      Marital Status:    Intimate Partner Violence:      Fear of Current or Ex-Partner:      Emotionally Abused:      Physically Abused:      Sexually Abused:            Medications  Allergies   No current outpatient medications on file.     No Known Allergies       Lab Results     Chemistry/lipid CBC Cardiac Enzymes/BNP/TSH/INR   Recent Labs   Lab Test 10/13/21  1025   CHOL 173   HDL 58      TRIG 49     Recent Labs   Lab Test 10/13/21  1025        Recent Labs   Lab Test 10/13/21  1025      POTASSIUM 4.7   CHLORIDE 103   CO2 29      BUN 19   CR 0.90   GFRESTIMATED >90   ROBY 10.0     Recent Labs   Lab Test 10/13/21  1025   CR 0.90     Recent Labs   Lab Test 10/13/21  1025   A1C 5.7*          Recent Labs   Lab Test 10/13/21  1025   WBC 4.6   HGB 14.6   HCT 45.1   MCV 89        Recent Labs   Lab Test 10/13/21  1025   HGB 14.6    No results for input(s): TROPONINI in the last 52176 hours.  No results for input(s): BNP, NTBNPI, NTBNP in the last 63028 hours.  No results for input(s): TSH in the last 55522 hours.  No results for input(s): INR in the last 08437 hours.     Chau Liu MD

## 2021-11-12 ENCOUNTER — HOSPITAL ENCOUNTER (OUTPATIENT)
Dept: CARDIOLOGY | Facility: CLINIC | Age: 51
Discharge: HOME | End: 2021-11-12
Attending: INTERNAL MEDICINE | Admitting: INTERNAL MEDICINE
Payer: COMMERCIAL

## 2021-11-12 DIAGNOSIS — I25.83 CORONARY ARTERY DISEASE DUE TO LIPID RICH PLAQUE: ICD-10-CM

## 2021-11-12 DIAGNOSIS — Z82.49 FAMILY HISTORY OF PREMATURE CORONARY ARTERY DISEASE: ICD-10-CM

## 2021-11-12 DIAGNOSIS — I25.10 CORONARY ARTERY DISEASE DUE TO LIPID RICH PLAQUE: ICD-10-CM

## 2021-11-12 PROCEDURE — 93350 STRESS TTE ONLY: CPT | Mod: 26 | Performed by: INTERNAL MEDICINE

## 2021-11-12 PROCEDURE — 93018 CV STRESS TEST I&R ONLY: CPT | Performed by: INTERNAL MEDICINE

## 2021-11-12 PROCEDURE — 93325 DOPPLER ECHO COLOR FLOW MAPG: CPT | Mod: 26 | Performed by: INTERNAL MEDICINE

## 2021-11-12 PROCEDURE — 93016 CV STRESS TEST SUPVJ ONLY: CPT | Performed by: INTERNAL MEDICINE

## 2021-11-12 PROCEDURE — 93321 DOPPLER ECHO F-UP/LMTD STD: CPT | Mod: 26 | Performed by: INTERNAL MEDICINE

## 2021-11-12 PROCEDURE — 93325 DOPPLER ECHO COLOR FLOW MAPG: CPT | Mod: TC

## 2021-11-12 PROCEDURE — 93350 STRESS TTE ONLY: CPT | Mod: TC

## 2021-12-22 ENCOUNTER — TRANSFERRED RECORDS (OUTPATIENT)
Dept: HEALTH INFORMATION MANAGEMENT | Facility: CLINIC | Age: 51
End: 2021-12-22
Payer: COMMERCIAL

## 2022-10-01 ENCOUNTER — HEALTH MAINTENANCE LETTER (OUTPATIENT)
Age: 52
End: 2022-10-01

## 2023-01-03 ENCOUNTER — NURSE TRIAGE (OUTPATIENT)
Dept: NURSING | Facility: CLINIC | Age: 53
End: 2023-01-03

## 2023-01-03 NOTE — TELEPHONE ENCOUNTER
Nurse Triage SBAR    Is this a 2nd Level Triage? YES, LICENSED PRACTITIONER REVIEW IS REQUIRED    Situation:   Waist/buttock pain that is radiating down to right knee    Background:   Patient has had what he thought was sciatica pain since July. Patient unaware of any trauma or falls that occurred around that time. Usually it's off and on with cycles of mild to severe with a pinching pain, but the last few days it's been constant and becomes severe at times affecting ADLs.    Assessment:   Patient laying down is a 1/10, when sitting it's a 4/10, and walking it's a 7/10 pain. States it's a pulsating pain that goes from his waist to back of right knee. States it's very hard for him to bear weight on that leg and will walk with a limp.    Protocol Recommended Disposition:   Go To ED/UCC Now (Or To Office With PCP Approval), See More Appropriate Protocol    Recommendation:   Will route message to Dr. Leal and care team regarding symptoms. Asked patient to wait to hear back till around 10:00 AM from office. If no responose, to call either clinic or us back or go to [facility]. UCC picked out at [place], which opens up at 10:00 AM. Patient states he's been taking Motrin and Tylenol to no effecitve. Placed a Lidocaine patch on as well last night, with little to no effect. Gave other care advice. Patient understands plan and will follow.    Routed to provider    Does the patient meet one of the following criteria for ADS visit consideration? 16+ years old, with an [facility] PCP     TIP  Providers, please consider if this condition is appropriate for management at one of our Acute and Diagnostic Services sites.     If patient is a good candidate, please use dotphrase <dot>triageresponse and select Refer to ADS to document.    MARIA A SILVA RN on 1/3/2023 at 8:57 AM    Reason for Disposition    Back pain radiating (shooting) into leg(s)    Weakness of a leg or foot (e.g., unable to bear weight, dragging foot)    Additional  Information    Negative: Looks like a broken bone or dislocated joint (e.g., crooked or deformed)    Negative: Followed a hip injury    Negative: Followed a knee injury    Negative: Followed an ankle or foot injury    Negative: Passed out (i.e., fainted, collapsed and was not responding)    Negative: Shock suspected (e.g., cold/pale/clammy skin, too weak to stand, low BP, rapid pulse)    Negative: Sounds like a life-threatening emergency to the triager    Negative: Major injury to the back (e.g., MVA, fall > 10 feet or 3 meters, penetrating injury, etc.)    Negative: Pain in the upper back over the ribs (rib cage) that radiates (travels) into the chest    Negative: Pain in the upper back over the ribs (rib cage) and worsened by coughing (or clearly increases with breathing)    Negative: Back pain during pregnancy    Negative: SEVERE back pain of sudden onset and age > 60 years    Negative: SEVERE abdominal pain (e.g., excruciating)    Negative: Abdominal pain and age > 60 years    Negative: Unable to urinate (or only a few drops) and bladder feels very full    Negative: Loss of bladder or bowel control (urine or bowel incontinence; wetting self, leaking stool) of new-onset    Negative: Numbness (loss of sensation) in groin or rectal area    Negative: Pain radiates into groin, scrotum    Negative: Blood in urine (red, pink, or tea-colored)    Negative: Vomiting and pain over lower ribs of back (i.e., flank - kidney area)    Protocols used: LEG PAIN-A-OH, BACK PAIN-A-OH

## 2023-01-03 NOTE — TELEPHONE ENCOUNTER
PCP is not in the clinic.       RN will route this encounter to DOD, , to review and advise.          Mayra Valdez RN  [facility]

## 2023-01-03 NOTE — TELEPHONE ENCOUNTER
I think urgent care is reasonable.  If not, I do not see great urgency, we could use reserved appointment and see him within a week.

## 2023-01-04 ENCOUNTER — VIRTUAL VISIT (OUTPATIENT)
Dept: FAMILY MEDICINE | Facility: CLINIC | Age: 53
End: 2023-01-04
Payer: COMMERCIAL

## 2023-01-04 DIAGNOSIS — M54.41 CHRONIC RIGHT-SIDED LOW BACK PAIN WITH RIGHT-SIDED SCIATICA: Primary | ICD-10-CM

## 2023-01-04 DIAGNOSIS — G89.29 CHRONIC RIGHT-SIDED LOW BACK PAIN WITH RIGHT-SIDED SCIATICA: Primary | ICD-10-CM

## 2023-01-04 PROBLEM — Z86.0100 HISTORY OF COLONIC POLYPS: Status: ACTIVE | Noted: 2021-12-27

## 2023-01-04 PROBLEM — D12.2 BENIGN NEOPLASM OF ASCENDING COLON: Status: ACTIVE | Noted: 2017-07-03

## 2023-01-04 PROBLEM — D12.3 BENIGN NEOPLASM OF TRANSVERSE COLON: Status: ACTIVE | Noted: 2021-12-27

## 2023-01-04 PROCEDURE — 99214 OFFICE O/P EST MOD 30 MIN: CPT | Mod: 95 | Performed by: STUDENT IN AN ORGANIZED HEALTH CARE EDUCATION/TRAINING PROGRAM

## 2023-01-04 RX ORDER — GABAPENTIN 100 MG/1
CAPSULE ORAL
Qty: 30 CAPSULE | Refills: 3 | Status: SHIPPED | OUTPATIENT
Start: 2023-01-04

## 2023-01-04 NOTE — PATIENT INSTRUCTIONS
It sounds like you have sciatica or pinched nerve in your low back. I recommend getting an XR and MRI of your low back to see what is causing this problem. This will look for arthritis or narrowing of the spinal canal, which can pinch or put pressure on the nerves. Depending on what the images show, you may be a candidate for other pain relief options such as steroid injections in the back.    Pain relief  -continue tylenol and ibuprofen  -alternate lidocaine and menthol patches as long as there is no skin irritation  -Start gabapentin 100mg at night. You can increase this up to 300mg at night if needed. The main side effect is it can make you sleepy.    Tylenol: 1000mg every 8hrs (max 3000mg/day)  Ibuprofen: 600mg every 6 hours (max 3000mg/day)    Follow up with PCP in 1 month for in person visit and exam if your symptoms are not improving.  I will mychart you with the results of the XR and MRI.    Start physical therapy. They will call you to set this up.    Dr. Villeda    To schedule any ordered imaging studies you can call [facility] Scheduling at [phone].

## 2023-01-04 NOTE — PROGRESS NOTES
Zachariah is a 52 year old who is being evaluated via a billable video visit.      How would you like to obtain your AVS? MyChart  If the video visit is dropped, the invitation should be resent by: Text to cell phone: [phone]  Will anyone else be joining your video visit? No    Assessment & Plan     Chronic right-sided low back pain with right-sided sciatica  Chronic worsening R sided sciatica in R leg down to knees and sometimes toes. No red flag signs. Affecting work and sleep. Symptoms persistent and worsening for >6 months. Will get lumbar XR and MRI to further evaluate. Continue tylenol, nsaids, and lidocaine/menthol patches. Will start gabapentin 100mg at bedtime-can increase up to 300mg. Counseled on side effects. PT ordered. Follow up in 1 month for in person exam if no improvement in symptoms.  - MR Lumbar Spine w/o Contrast; Future  - XR Lumbar Spine 2/3 Views; Future  - gabapentin (NEURONTIN) 100 MG capsule; Take 100mg (1 tablet) at night. You can increase up to 300mg at night if needed.  - Physical Therapy Referral; Future      No follow-ups on file.    Vinicius Villeda, [facility]    Subjective   Zachariah is a 52 year old presenting for the following health issues here with wife.  Pain (The pain has fluxuated from moderate to extreme and has changed locations within my leg over time.)      History of Present Illness       Reason for visit:  Serious sciatic pain in my right leg  Symptom onset:  More than a month  Symptoms include:  Variable - currently, persistent shooting pain down my right leg  Symptom intensity:  Severe  Symptom progression:  Staying the same  Had these symptoms before:  No  What makes it worse:  Certain physical positions make it worse  What makes it better:  Sometimes lying down; minor relief through ibuprofen    He eats 4 or more servings of fruits and vegetables daily.He consumes 0 sweetened beverage(s) daily.He exercises with enough effort to  increase his heart rate 9 or less minutes per day.  He exercises with enough effort to increase his heart rate 3 or less days per week.   He is taking medications regularly.     R leg  Sciatic pain  Injured self opening manual garage door (jerked it open quickly)  Started july 2022  Started with sharp pain in hip with sit/stand or certain positions (no pain in other positions)  Would cycle between severe & mild    Last week it has changed  -no sharp pains in hip, but still there  -instead from waist to knee has constant pain, at times severe  -limps at times  -yesterday-moved to foot  -dull pulsing pain that doesn't quit  -no position helps  -hard to work, concentrate  -affecting sleep, work  -no weakness, bowel/bladder incontinence  -never had before    Tried: lidocaine patch, ibuprofen, tylenol which is helping    Review of Systems   Constitutional, HEENT, cardiovascular, pulmonary, GI, , musculoskeletal, neuro, skin, endocrine and psych systems are negative, except as otherwise noted.      Objective         Vitals:  No vitals were obtained today due to virtual visit.    Physical Exam   GENERAL: Healthy, alert and no distress  EYES: Eyes grossly normal to inspection.  No discharge or erythema, or obvious scleral/conjunctival abnormalities.  RESP: No audible wheeze, cough, or visible cyanosis.  No visible retractions or increased work of breathing.    SKIN: Visible skin clear. No significant rash, abnormal pigmentation or lesions.  NEURO: Cranial nerves grossly intact.  Mentation and speech appropriate for age.  PSYCH: Mentation appears normal, affect normal/bright, judgement and insight intact, normal speech and appearance well-groomed.      Video-Visit Details    Type of service:  Video Visit   Originating Location (pt. Location): Home  Distant Location (provider location):  On-site  Platform used for Video Visit: Glocal

## 2023-02-05 ENCOUNTER — HEALTH MAINTENANCE LETTER (OUTPATIENT)
Age: 53
End: 2023-02-05

## 2024-03-03 ENCOUNTER — HEALTH MAINTENANCE LETTER (OUTPATIENT)
Age: 54
End: 2024-03-03

## 2025-03-16 ENCOUNTER — HEALTH MAINTENANCE LETTER (OUTPATIENT)
Age: 55
End: 2025-03-16

## 2025-08-06 ENCOUNTER — OFFICE VISIT (OUTPATIENT)
Dept: FAMILY MEDICINE | Facility: CLINIC | Age: 55
End: 2025-08-06
Payer: COMMERCIAL

## 2025-08-06 VITALS
RESPIRATION RATE: 21 BRPM | SYSTOLIC BLOOD PRESSURE: 122 MMHG | OXYGEN SATURATION: 97 % | TEMPERATURE: 98.6 F | BODY MASS INDEX: 24.79 KG/M2 | WEIGHT: 183 LBS | HEART RATE: 76 BPM | HEIGHT: 72 IN | DIASTOLIC BLOOD PRESSURE: 74 MMHG

## 2025-08-06 DIAGNOSIS — Z13.6 SCREENING FOR CARDIOVASCULAR CONDITION: ICD-10-CM

## 2025-08-06 DIAGNOSIS — Z87.898 HISTORY OF PREDIABETES: ICD-10-CM

## 2025-08-06 DIAGNOSIS — Z00.00 ROUTINE GENERAL MEDICAL EXAMINATION AT A HEALTH CARE FACILITY: Primary | ICD-10-CM

## 2025-08-06 DIAGNOSIS — Z23 NEED FOR PNEUMOCOCCAL 20-VALENT CONJUGATE VACCINATION: ICD-10-CM

## 2025-08-06 DIAGNOSIS — Z12.5 SCREENING FOR PROSTATE CANCER: ICD-10-CM

## 2025-08-06 DIAGNOSIS — Z23 NEED FOR HEPATITIS B VACCINATION: ICD-10-CM

## 2025-08-06 LAB
ALBUMIN SERPL BCG-MCNC: 4.5 G/DL (ref 3.5–5.2)
ALP SERPL-CCNC: 73 U/L (ref 40–150)
ALT SERPL W P-5'-P-CCNC: 24 U/L (ref 0–70)
ANION GAP SERPL CALCULATED.3IONS-SCNC: 13 MMOL/L (ref 7–15)
AST SERPL W P-5'-P-CCNC: 25 U/L (ref 0–45)
BILIRUB SERPL-MCNC: 0.3 MG/DL
BUN SERPL-MCNC: 19.8 MG/DL (ref 6–20)
CALCIUM SERPL-MCNC: 9.4 MG/DL (ref 8.8–10.4)
CHLORIDE SERPL-SCNC: 103 MMOL/L (ref 98–107)
CHOLEST SERPL-MCNC: 187 MG/DL
CREAT SERPL-MCNC: 0.84 MG/DL (ref 0.67–1.17)
EGFRCR SERPLBLD CKD-EPI 2021: >90 ML/MIN/1.73M2
EST. AVERAGE GLUCOSE BLD GHB EST-MCNC: 123 MG/DL
FASTING STATUS PATIENT QL REPORTED: YES
FASTING STATUS PATIENT QL REPORTED: YES
GLUCOSE SERPL-MCNC: 93 MG/DL (ref 70–99)
HBA1C MFR BLD: 5.9 % (ref 0–5.6)
HCO3 SERPL-SCNC: 25 MMOL/L (ref 22–29)
HDLC SERPL-MCNC: 56 MG/DL
LDLC SERPL CALC-MCNC: 119 MG/DL
NONHDLC SERPL-MCNC: 131 MG/DL
POTASSIUM SERPL-SCNC: 4 MMOL/L (ref 3.4–5.3)
PROT SERPL-MCNC: 7.3 G/DL (ref 6.4–8.3)
PSA SERPL DL<=0.01 NG/ML-MCNC: 0.74 NG/ML (ref 0–3.5)
SODIUM SERPL-SCNC: 141 MMOL/L (ref 135–145)
TRIGL SERPL-MCNC: 62 MG/DL

## 2025-08-06 PROCEDURE — 3074F SYST BP LT 130 MM HG: CPT | Performed by: FAMILY MEDICINE

## 2025-08-06 PROCEDURE — 3044F HG A1C LEVEL LT 7.0%: CPT | Performed by: FAMILY MEDICINE

## 2025-08-06 PROCEDURE — 99213 OFFICE O/P EST LOW 20 MIN: CPT | Mod: 25 | Performed by: FAMILY MEDICINE

## 2025-08-06 PROCEDURE — 90677 PCV20 VACCINE IM: CPT | Performed by: FAMILY MEDICINE

## 2025-08-06 PROCEDURE — 83036 HEMOGLOBIN GLYCOSYLATED A1C: CPT | Performed by: FAMILY MEDICINE

## 2025-08-06 PROCEDURE — 36415 COLL VENOUS BLD VENIPUNCTURE: CPT | Performed by: FAMILY MEDICINE

## 2025-08-06 PROCEDURE — 90471 IMMUNIZATION ADMIN: CPT | Performed by: FAMILY MEDICINE

## 2025-08-06 PROCEDURE — 90746 HEPB VACCINE 3 DOSE ADULT IM: CPT | Performed by: FAMILY MEDICINE

## 2025-08-06 PROCEDURE — 3078F DIAST BP <80 MM HG: CPT | Performed by: FAMILY MEDICINE

## 2025-08-06 PROCEDURE — G0103 PSA SCREENING: HCPCS | Performed by: FAMILY MEDICINE

## 2025-08-06 PROCEDURE — 99396 PREV VISIT EST AGE 40-64: CPT | Mod: 25 | Performed by: FAMILY MEDICINE

## 2025-08-06 PROCEDURE — 80053 COMPREHEN METABOLIC PANEL: CPT | Performed by: FAMILY MEDICINE

## 2025-08-06 PROCEDURE — 90472 IMMUNIZATION ADMIN EACH ADD: CPT | Performed by: FAMILY MEDICINE

## 2025-08-06 PROCEDURE — 80061 LIPID PANEL: CPT | Performed by: FAMILY MEDICINE

## 2025-08-06 PROCEDURE — 3049F LDL-C 100-129 MG/DL: CPT | Performed by: FAMILY MEDICINE

## 2025-08-06 SDOH — HEALTH STABILITY: PHYSICAL HEALTH: ON AVERAGE, HOW MANY MINUTES DO YOU ENGAGE IN EXERCISE AT THIS LEVEL?: 30 MIN

## 2025-08-06 SDOH — HEALTH STABILITY: PHYSICAL HEALTH: ON AVERAGE, HOW MANY DAYS PER WEEK DO YOU ENGAGE IN MODERATE TO STRENUOUS EXERCISE (LIKE A BRISK WALK)?: 3 DAYS

## 2025-08-06 ASSESSMENT — SOCIAL DETERMINANTS OF HEALTH (SDOH): HOW OFTEN DO YOU GET TOGETHER WITH FRIENDS OR RELATIVES?: ONCE A WEEK

## 2025-09-04 ENCOUNTER — ALLIED HEALTH/NURSE VISIT (OUTPATIENT)
Dept: FAMILY MEDICINE | Facility: CLINIC | Age: 55
End: 2025-09-04
Payer: COMMERCIAL

## 2025-09-04 DIAGNOSIS — Z23 ENCOUNTER FOR IMMUNIZATION: Primary | ICD-10-CM
